# Patient Record
Sex: FEMALE | Race: WHITE
[De-identification: names, ages, dates, MRNs, and addresses within clinical notes are randomized per-mention and may not be internally consistent; named-entity substitution may affect disease eponyms.]

---

## 2017-02-06 ENCOUNTER — HOSPITAL ENCOUNTER (OUTPATIENT)
Dept: HOSPITAL 39 - GMAB | Age: 77
Discharge: HOME | End: 2017-02-06
Attending: FAMILY MEDICINE
Payer: MEDICARE

## 2017-02-06 DIAGNOSIS — C50.919: Primary | ICD-10-CM

## 2017-04-28 ENCOUNTER — HOSPITAL ENCOUNTER (OUTPATIENT)
Dept: HOSPITAL 39 - GMAB | Age: 77
Discharge: HOME | End: 2017-04-28
Attending: FAMILY MEDICINE
Payer: MEDICARE

## 2017-04-28 DIAGNOSIS — C50.919: Primary | ICD-10-CM

## 2017-07-10 ENCOUNTER — HOSPITAL ENCOUNTER (OUTPATIENT)
Dept: HOSPITAL 39 - GMAB | Age: 77
Discharge: HOME | End: 2017-07-10
Attending: FAMILY MEDICINE
Payer: MEDICARE

## 2017-07-10 DIAGNOSIS — C50.919: Primary | ICD-10-CM

## 2017-07-10 DIAGNOSIS — I10: ICD-10-CM

## 2017-07-27 ENCOUNTER — HOSPITAL ENCOUNTER (OUTPATIENT)
Dept: HOSPITAL 39 - MAMMO | Age: 77
End: 2017-07-27
Attending: FAMILY MEDICINE
Payer: MEDICARE

## 2017-07-27 DIAGNOSIS — Z12.31: Primary | ICD-10-CM

## 2017-08-01 NOTE — MAM
EXAM DESCRIPTION: 

3D Screening BILATERAL



CLINICAL HISTORY: 

77 yearsFemaleSCREENING. Left mastectomy for cancer in 2014.

Postmenopausal..



COMPARISON: 

Digital 2-D screening right breast study 7/26/2016.   Reports

from prior examinations also reviewed.  



TECHNIQUE: 

Right CC and MLO projection full-field images, 3-D tomosynthesis

digital mammographic technique. Also bilateral synthesized CC/

MLO full-field images.  CAD not utilized.



FINDINGS: 

Right breast parenchymal density pattern is: Scattered areas of

fibroglandular density. No skin thickening or nipple retraction 

right axillary lymph node.

No focal, stellate mass or density, focal asymmetry , and no

suspicious microcalcifications right breast. Stable mammograms

compared to prior study, taking into account differences in

mammographic technique



IMPRESSION: 

BI-RADS CATEGORY: 2 - BENIGN FINDINGS.

FOLLOW UP: Routine digital right breast screening, one year

interval from July 2017.



Written communication explaining the findings and follow-up, will

be mailed to the patient and referring health care provider.



According to the [employer], yearly mammograms

are recommended starting at age 40 and continuing as long as a

woman is in good health.  Any breast change noted on a breast

self-exam should be reported promptly to the patient's healthcare

provider.  Breast MRI is recommended for women with an

approximately 20-25% or greater lifetime risk of breast cancer,

including women with a strong family history of breast or ovarian

cancer and women who have been treated for Hodgkin's disease.



A negative mammographic report should not delay tissue diagnosis

in patients with significant clinical history or physical

findings.



Extremely dense breast tissue limits the sensitivity of digital

mammography. 



Electronically signed by:  Se Holliday MD  8/1/2017 4:26 PM CDT

Workstation: [id number]

## 2018-06-26 ENCOUNTER — HOSPITAL ENCOUNTER (OUTPATIENT)
Dept: HOSPITAL 39 - GMAB | Age: 78
End: 2018-06-26
Attending: FAMILY MEDICINE
Payer: MEDICARE

## 2018-06-26 DIAGNOSIS — C50.919: Primary | ICD-10-CM

## 2018-07-19 ENCOUNTER — HOSPITAL ENCOUNTER (OUTPATIENT)
Dept: HOSPITAL 39 - GMAE | Age: 78
End: 2018-07-19
Attending: FAMILY MEDICINE
Payer: MEDICARE

## 2018-07-19 DIAGNOSIS — I10: Primary | ICD-10-CM

## 2018-07-30 ENCOUNTER — HOSPITAL ENCOUNTER (OUTPATIENT)
Dept: HOSPITAL 39 - MAMMO | Age: 78
End: 2018-07-30
Attending: INTERNAL MEDICINE
Payer: MEDICARE

## 2018-07-30 DIAGNOSIS — Z12.31: Primary | ICD-10-CM

## 2018-07-31 NOTE — MAM
EXAM DESCRIPTION: 

3D Screening BILATERAL : Digital Mammography.



CLINICAL HISTORY: 

78 years Female ANNUAL SCREENING . Left breast cancer and

mastectomy 2014. Remote family history of breast cancer.

Childbirth. Postmenopausal. No HRT.



COMPARISON: 

2-D digital screening study right breast 7/27/2017.    Reports

from prior examinations also reviewed.  



TECHNIQUE: 

Right CC and MLO projection full-field images,  3-D tomosynthesis

digital mammographic technique.  CAD not utilized.   



FINDINGS: 

Right breast parenchymal density pattern is:  Scattered areas of

fibroglandular density.   No skin thickening or nipple

retraction.  Solitary microcalcifications right breast.

Unremarkable right axillary lymph node. Prominent axillary skin

fold..

No new focal, stellate mass or density, focal asymmetry , and no

suspicious microcalcifications right breast. Stable mammograms

compared to prior study, taking into account differences in

mammographic technique.



IMPRESSION: 

BI-RADS CATEGORY: 2 - BENIGN FINDINGS.

FOLLOW UP: Routine digital right breast screening, one year

interval from  July 2018.



Written communication explaining the IMPRESSION and follow-up,

will be mailed to the patient and referring health care provider.



According to the [employer], yearly mammograms

are recommended starting at age 40 and continuing as long as a

woman is in good health.  Any breast change noted on a breast

self-exam should be reported promptly to the patient's healthcare

provider.  Breast MRI is recommended for women with an

approximately 20-25% or greater lifetime risk of breast cancer,

including women with a strong family history of breast or ovarian

cancer and women who have been treated for Hodgkin's disease.



A negative mammographic report should not delay tissue diagnosis

in patients with significant clinical history or physical

findings.



Extremely dense breast tissue limits the sensitivity of digital

mammography. 



Electronically signed by:  Se Holliday MD  7/31/2018 9:00 AM CDT

Workstation: [id number]

## 2018-09-10 ENCOUNTER — HOSPITAL ENCOUNTER (OUTPATIENT)
Dept: HOSPITAL 39 - GMAE | Age: 78
End: 2018-09-10
Attending: FAMILY MEDICINE
Payer: MEDICARE

## 2018-09-10 DIAGNOSIS — R20.2: Primary | ICD-10-CM

## 2019-03-28 ENCOUNTER — HOSPITAL ENCOUNTER (OUTPATIENT)
Dept: HOSPITAL 39 - GMAE | Age: 79
End: 2019-03-28
Attending: FAMILY MEDICINE
Payer: MEDICARE

## 2019-03-28 DIAGNOSIS — C50.412: Primary | ICD-10-CM

## 2019-07-22 ENCOUNTER — HOSPITAL ENCOUNTER (OUTPATIENT)
Dept: HOSPITAL 39 - GMAE | Age: 79
End: 2019-07-22
Attending: FAMILY MEDICINE
Payer: MEDICARE

## 2019-07-22 DIAGNOSIS — E11.9: ICD-10-CM

## 2019-07-22 DIAGNOSIS — E78.2: ICD-10-CM

## 2019-07-22 DIAGNOSIS — I10: Primary | ICD-10-CM

## 2019-11-01 ENCOUNTER — HOSPITAL ENCOUNTER (OUTPATIENT)
Dept: HOSPITAL 39 - MAMMO | Age: 79
End: 2019-11-01
Attending: INTERNAL MEDICINE
Payer: MEDICARE

## 2019-11-01 DIAGNOSIS — Z12.31: Primary | ICD-10-CM

## 2019-11-04 NOTE — MAM
EXAM DESCRIPTION: 

3D Screening BILATERAL : Digital Mammography.



CLINICAL HISTORY: 

79 years Female ANNUAL SCREENING . Left breast cancer and

mastectomy 2014. Remote family history of breast cancer. 

Lifetime risk of developing breast cancer (Tyrer-Cuzick

model)(%):  Not calculated due to personal history of breast

cancer.



COMPARISON: 

Right breast digital screening tomosynthesis 30 July 2018 and 27 July 2017..   



TECHNIQUE: 

Right breast CC and MLO projection full-field images, digital

tomosynthesis mammographic technique. Right breast digital 2-D

full-field MLO images.  CAD not available. 



FINDINGS: 

Right breast parenchymal density pattern is: Scattered areas of

fibroglandular density. No skin thickening or nipple retraction. 

Right axillary lymph nodes. Solitary microcalcifications.

No new focal, stellate mass or density, focal asymmetry , and no

suspicious microcalcifications right breast. Stable mammograms

compared to prior study.  



IMPRESSION: 

Benign exam.



BIRAD CATEGORY: 2 BENIGN FINDINGS.



RECOMMENDATIONS:

FOLLOW UP: Routine digital right breast mammographic screening,

one year interval from  November 2019.



Written communication explaining the IMPRESSION and follow-up,

will be mailed to the patient and referring health care provider.





According to the [employer], yearly mammograms

are recommended starting at age 40 and continuing as long as a

woman is in good health.  Any breast change noted on a breast

self-exam should be reported promptly to the patient's healthcare

provider.  Breast MRI is recommended for women with an

approximately 20-25% or greater lifetime risk of breast cancer,

including women with a strong family history of breast or ovarian

cancer and women who have been treated for Hodgkin's disease.



A negative mammographic report should not delay tissue diagnosis

in patients with significant clinical history or physical

findings.



Extremely dense breast tissue limits the sensitivity of digital

mammography. 



Electronically signed by:  Se Holliday MD  11/4/2019 11:14 AM

[facility] Workstation: [id number]

## 2020-03-24 ENCOUNTER — HOSPITAL ENCOUNTER (OUTPATIENT)
Dept: HOSPITAL 39 - GMAE | Age: 80
End: 2020-03-24
Attending: FAMILY MEDICINE
Payer: MEDICARE

## 2020-03-24 DIAGNOSIS — M06.9: Primary | ICD-10-CM

## 2020-07-06 ENCOUNTER — HOSPITAL ENCOUNTER (OUTPATIENT)
Dept: HOSPITAL 39 - GMAE | Age: 80
End: 2020-07-06
Attending: FAMILY MEDICINE
Payer: MEDICARE

## 2020-07-06 DIAGNOSIS — C50.412: Primary | ICD-10-CM

## 2020-07-23 ENCOUNTER — HOSPITAL ENCOUNTER (OUTPATIENT)
Dept: HOSPITAL 39 - GMAE | Age: 80
End: 2020-07-23
Attending: FAMILY MEDICINE
Payer: MEDICARE

## 2020-07-23 DIAGNOSIS — E78.2: ICD-10-CM

## 2020-07-23 DIAGNOSIS — I10: Primary | ICD-10-CM

## 2020-07-23 DIAGNOSIS — R73.03: ICD-10-CM

## 2020-11-11 ENCOUNTER — HOSPITAL ENCOUNTER (OUTPATIENT)
Dept: HOSPITAL 39 - MAMMO | Age: 80
End: 2020-11-11
Attending: FAMILY MEDICINE
Payer: MEDICARE

## 2020-11-11 DIAGNOSIS — Z12.31: Primary | ICD-10-CM

## 2020-11-12 NOTE — MAM
EXAM DESCRIPTION: 

3D Screening BILATERAL : Digital Mammography.



CLINICAL HISTORY: 

80 years Female screening . Left breast malignancy 2014 with

mastectomy. Remote family history of breast cancer. Menarche age

15. Childbirth age 19. Menopause age unknown. No HRT.. Lifetime

risk of developing breast cancer (Tyrer-Cuzick model)(%):  Not

Calculated due to personal history of breast cancer.



COMPARISON: 

Right breast screening digital tomosynthesis November 2019 and

July 2018.  



TECHNIQUE: 

Right CC and MLO projection full-field images, digital

tomosynthesis mammographic technique. Right digital 2-D

full-field MLO images.  CAD available for 2-D images.  



FINDINGS: 

Right breast parenchymal density pattern is: Scattered areas of

fibroglandular density. No skin thickening or nipple retraction. 

Solitary microcalcifications.

No new focal, stellate mass or density, focal asymmetry , and no

suspicious microcalcifications right breast. Stable mammograms

compared to prior study. 



IMPRESSION: 

Benign exam.



BIRAD CATEGORY: 2 BENIGN FINDINGS.



RECOMMENDATIONS:

FOLLOW UP: Routine digital right breast mammographic screening,

one year interval from  November 2020.



Written communication explaining the IMPRESSION and follow-up,

will be mailed to the patient and referring health care provider.



According to the [employer], yearly mammograms

are recommended starting at age 40 and continuing as long as a

woman is in good health.  Any breast change noted on a breast

self-exam should be reported promptly to the patient's healthcare

provider.  Breast MRI is recommended for women with an

approximately 20-25% or greater lifetime risk of breast cancer,

including women with a strong family history of breast or ovarian

cancer and women who have been treated for Hodgkin's disease.  A

negative mammographic report should not delay tissue diagnosis in

patients with significant clinical history or physical findings. 

Extremely dense breast tissue limits the sensitivity of digital

mammography. 





Electronically signed by:  Se Holliday MD  11/12/2020 7:15 PM

[facility] [place] Workstation: [id number]

## 2020-11-18 ENCOUNTER — HOSPITAL ENCOUNTER (OUTPATIENT)
Dept: HOSPITAL 39 - GMAE | Age: 80
End: 2020-11-18
Attending: FAMILY MEDICINE
Payer: MEDICARE

## 2020-11-18 DIAGNOSIS — I10: ICD-10-CM

## 2020-11-18 DIAGNOSIS — E78.2: ICD-10-CM

## 2020-11-18 DIAGNOSIS — D51.3: Primary | ICD-10-CM
